# Patient Record
Sex: MALE | URBAN - METROPOLITAN AREA
[De-identification: names, ages, dates, MRNs, and addresses within clinical notes are randomized per-mention and may not be internally consistent; named-entity substitution may affect disease eponyms.]

---

## 2023-05-24 ENCOUNTER — APPOINTMENT (OUTPATIENT)
Age: 38
Setting detail: DERMATOLOGY
End: 2023-05-24

## 2023-05-24 DIAGNOSIS — L72.8 OTHER FOLLICULAR CYSTS OF THE SKIN AND SUBCUTANEOUS TISSUE: ICD-10-CM

## 2023-05-24 DIAGNOSIS — Z71.89 OTHER SPECIFIED COUNSELING: ICD-10-CM

## 2023-05-24 DIAGNOSIS — L73.8 OTHER SPECIFIED FOLLICULAR DISORDERS: ICD-10-CM

## 2023-05-24 DIAGNOSIS — L29.8 OTHER PRURITUS: ICD-10-CM

## 2023-05-24 DIAGNOSIS — D22 MELANOCYTIC NEVI: ICD-10-CM

## 2023-05-24 PROBLEM — D23.71 OTHER BENIGN NEOPLASM OF SKIN OF RIGHT LOWER LIMB, INCLUDING HIP: Status: ACTIVE | Noted: 2023-05-24

## 2023-05-24 PROBLEM — D22.5 MELANOCYTIC NEVI OF TRUNK: Status: ACTIVE | Noted: 2023-05-24

## 2023-05-24 PROCEDURE — OTHER OTHER: OTHER

## 2023-05-24 PROCEDURE — OTHER INTRALESIONAL KENALOG: OTHER

## 2023-05-24 PROCEDURE — 11900 INJECT SKIN LESIONS </W 7: CPT

## 2023-05-24 PROCEDURE — OTHER COUNSELING: OTHER

## 2023-05-24 PROCEDURE — 99203 OFFICE O/P NEW LOW 30 MIN: CPT | Mod: 25

## 2023-05-24 PROCEDURE — OTHER SUNSCREEN RECOMMENDATIONS: OTHER

## 2023-05-24 ASSESSMENT — LOCATION ZONE DERM
LOCATION ZONE: TRUNK
LOCATION ZONE: LEG
LOCATION ZONE: FACE

## 2023-05-24 ASSESSMENT — LOCATION DETAILED DESCRIPTION DERM
LOCATION DETAILED: RIGHT SUPERIOR LATERAL BUCCAL CHEEK
LOCATION DETAILED: RIGHT LATERAL PROXIMAL PRETIBIAL REGION
LOCATION DETAILED: RIGHT PROXIMAL PRETIBIAL REGION
LOCATION DETAILED: RIGHT INFERIOR MEDIAL UPPER BACK
LOCATION DETAILED: RIGHT SUPERIOR UPPER BACK

## 2023-05-24 ASSESSMENT — LOCATION SIMPLE DESCRIPTION DERM
LOCATION SIMPLE: RIGHT PRETIBIAL REGION
LOCATION SIMPLE: RIGHT CHEEK
LOCATION SIMPLE: RIGHT UPPER BACK

## 2023-05-24 NOTE — HPI: SKIN LESION
What Type Of Note Output Would You Prefer (Optional)?: Bullet Format
How Severe Is Your Skin Lesion?: moderate
Has Your Skin Lesion Been Treated?: not been treated
Is This A New Presentation, Or A Follow-Up?: Skin Lesions
1. I was told the name of the doctor(s) who took care of my child while in the hospital.    2. I have been told about any important findings on my child's plan of care.    3. The doctor clearly explained my child's diagnosis and other possible diagnoses that were considered.    4. My child's doctor explained all the tests that were done and their results (if available). I understand that some of the test results may not be ready before we go home and I was told how I can get these results. I understand that a summary of my child's hospitalization and important test results will be shared with my child's outpatient doctor.    5. My child's doctor talked to me about what I need to do when we go home.    6. I understand what signs and symptoms to watch for. I understand what symptoms I would need to call my doctor for and/or return to the hospital.    7. I have the phone number to call the hospital for results and/or questions after I leave the hospital.

## 2023-05-24 NOTE — PROCEDURE: INTRALESIONAL KENALOG
How Many Mls Were Removed From The 10 Mg/Ml (5ml) Vial When Preparing The Injectable Solution?: 0
Detail Level: Detailed
Administered By (Optional): Gertrude Ulrich PA-C
Concentration Of Kenalog Solution Injected (Mg/Ml): 10.0
Show Inventory Tab: Hide
Bill For Wasted Drug (Kenalog)?: no
Total Volume (Ccs): 0.2
Medical Necessity Clause: This procedure was medically necessary because the lesions that were treated were:
Kenalog Preparation: Kenalog
Consent: The risks of atrophy were reviewed with the patient.
Validate Note Data When Using Inventory: Yes
Concentration Of Kenalog Solution Injected (Mg/Ml): 2.5

## 2023-05-24 NOTE — PROCEDURE: OTHER
Other (Free Text): Discussed performing punch excision
Detail Level: Zone
Render Risk Assessment In Note?: no
Note Text (......Xxx Chief Complaint.): This diagnosis correlates with the